# Patient Record
Sex: FEMALE | Race: WHITE | Employment: FULL TIME | URBAN - METROPOLITAN AREA
[De-identification: names, ages, dates, MRNs, and addresses within clinical notes are randomized per-mention and may not be internally consistent; named-entity substitution may affect disease eponyms.]

---

## 2024-04-08 ENCOUNTER — HOSPITAL ENCOUNTER (OUTPATIENT)
Age: 19
Discharge: HOME | End: 2024-04-08
Payer: COMMERCIAL

## 2024-04-08 ENCOUNTER — APPOINTMENT (OUTPATIENT)
Dept: GENERAL RADIOLOGY | Age: 19
End: 2024-04-08
Attending: NURSE PRACTITIONER
Payer: COMMERCIAL

## 2024-04-08 VITALS
OXYGEN SATURATION: 99 % | RESPIRATION RATE: 20 BRPM | TEMPERATURE: 98 F | DIASTOLIC BLOOD PRESSURE: 70 MMHG | HEART RATE: 74 BPM | SYSTOLIC BLOOD PRESSURE: 124 MMHG

## 2024-04-08 DIAGNOSIS — T14.8XXA FOREIGN BODY IN SKIN: Primary | ICD-10-CM

## 2024-04-08 PROCEDURE — 73140 X-RAY EXAM OF FINGER(S): CPT | Performed by: NURSE PRACTITIONER

## 2024-04-08 PROCEDURE — 99203 OFFICE O/P NEW LOW 30 MIN: CPT | Performed by: NURSE PRACTITIONER

## 2024-04-08 NOTE — ED PROVIDER NOTES
Patient Seen in: [facility]      History     Chief Complaint   Patient presents with    FB in Skin     Stated Complaint: glass in finger    Subjective:   HPI  Patient is a 18-year-old female who presents to the immediate care center with concern for foreign body like sensation in the distal tip of her right finger that has been persistent for approximately 1 month.  She broke a picture frame around that time and believe has had a small, retained splinter.  She only intermittently feels some shortness when she pushes in certain ways.  She has had no motor or sensory deficits; no redness or swelling; no drainage.          Objective:   History reviewed. No pertinent past medical history.           History reviewed. No pertinent surgical history.             Social History     Socioeconomic History    Marital status: Single   Tobacco Use    Smoking status: Never    Smokeless tobacco: Never              Review of Systems   Constitutional:  Negative for chills and fever.   Musculoskeletal:  Negative for arthralgias and myalgias.   Neurological:  Negative for weakness and numbness.       Positive for stated complaint: glass in finger  Other systems are as noted in HPI.  Constitutional and vital signs reviewed.      All other systems reviewed and negative except as noted above.    Physical Exam     ED Triage Vitals [04/08/24 1055]   /70   Pulse 74   Resp 20   Temp 98.3 °F (36.8 °C)   Temp src Temporal   SpO2 99 %   O2 Device None (Room air)       Current:/70   Pulse 74   Temp 98.3 °F (36.8 °C) (Temporal)   Resp 20   SpO2 99%         Physical Exam  Vitals and nursing note reviewed.   Constitutional:       General: She is not in acute distress.     Appearance: She is not ill-appearing.   Pulmonary:      Effort: Pulmonary effort is normal. No respiratory distress.   Skin:     General: Skin is warm and dry.   Neurological:      Mental Status: She is alert and oriented to person, place, and time.    Psychiatric:         Behavior: Behavior normal.               ED Course   Labs Reviewed - No data to display  XR FINGER(S) (MIN 2 VIEWS), RIGHT 3RD (CPT=73140)   Final Result   PROCEDURE: XR FINGER(S) (MIN 2 VIEWS), RIGHT 3RD (CPT=73140)       COMPARISON: None.       INDICATIONS: Glass FB to middle of distal end of right 3rd finger for 1    month.       TECHNIQUE: 3 views were obtained.         FINDINGS:    BONES: Normal. No significant arthropathy, fracture or acute abnormality.   SOFT TISSUES: Negative. No visible soft tissue swelling.    EFFUSION: None visible.    OTHER: Negative.                    =====   CONCLUSION: Normal examination.                 Dictated by (CST): Sundeep Grossman MD on 4/08/2024 at 11:29 AM        Finalized by (CST): Sundeep Grossman MD on 4/08/2024 at 11:29 AM                                  MDM      Radiographic findings reviewed with patient and mother at bedside prior to exploration.    The outer layers of the epidermis were removed by scraping with a 18-gauge needle.  This exposed what appeared to be a very small sliver of wood splinter.  This was removed without difficulty.  Patient tolerated well.    They were encouraged to follow-up primary care provider next week if symptoms continue, or sooner if redness, swelling, worsened pain.                                 Medical Decision Making  Differential diagnoses considered today include, but are not exclusive of: Retained cutaneous foreign body; cellulitis; nerve injury.    Problems Addressed:  Foreign body in skin: self-limited or minor problem    Amount and/or Complexity of Data Reviewed  Radiology: independent interpretation performed.     Details: No fracture or dislocation; no visible radiopaque foreign body on x-ray.    Risk  OTC drugs.        Disposition and Plan     Clinical Impression:  1. Foreign body in skin         Disposition:  Discharge  4/8/2024 11:44 am    Follow-up:  Your primary care provider  Call to schedule  appointment to be seen in 5-7 days.    As needed          Medications Prescribed:  There are no discharge medications for this patient.

## 2024-04-08 NOTE — ED INITIAL ASSESSMENT (HPI)
Pt here with a piece of glass in right 3rd finger for 1 month. Area is pink. Denies drainage. Pain with touch.